# Patient Record
Sex: FEMALE | Race: WHITE | Employment: OTHER | URBAN - METROPOLITAN AREA
[De-identification: names, ages, dates, MRNs, and addresses within clinical notes are randomized per-mention and may not be internally consistent; named-entity substitution may affect disease eponyms.]

---

## 2019-05-23 ENCOUNTER — HOSPITAL ENCOUNTER (OUTPATIENT)
Dept: GENERAL RADIOLOGY | Age: 74
Discharge: HOME | End: 2019-05-25
Payer: COMMERCIAL

## 2019-05-23 DIAGNOSIS — Z12.31 VISIT FOR SCREENING MAMMOGRAM: ICD-10-CM

## 2019-05-23 PROCEDURE — 77063 BREAST TOMOSYNTHESIS BI: CPT

## 2019-11-03 ENCOUNTER — APPOINTMENT (OUTPATIENT)
Dept: CT IMAGING | Age: 74
End: 2019-11-03
Payer: COMMERCIAL

## 2019-11-03 ENCOUNTER — HOSPITAL ENCOUNTER (EMERGENCY)
Age: 74
Discharge: HOME | End: 2019-11-04
Attending: EMERGENCY MEDICINE
Payer: COMMERCIAL

## 2019-11-03 VITALS
BODY MASS INDEX: 28.8 KG/M2 | DIASTOLIC BLOOD PRESSURE: 98 MMHG | HEIGHT: 62 IN | WEIGHT: 156.5 LBS | HEART RATE: 83 BPM | OXYGEN SATURATION: 99 % | RESPIRATION RATE: 18 BRPM | TEMPERATURE: 98.3 F | SYSTOLIC BLOOD PRESSURE: 176 MMHG

## 2019-11-03 DIAGNOSIS — I31.39 PERICARDIAL EFFUSION: ICD-10-CM

## 2019-11-03 DIAGNOSIS — R06.02 SHORTNESS OF BREATH: Primary | ICD-10-CM

## 2019-11-03 DIAGNOSIS — R93.89 ABNORMAL CT SCAN: ICD-10-CM

## 2019-11-03 DIAGNOSIS — I10 HYPERTENSION, UNSPECIFIED TYPE: ICD-10-CM

## 2019-11-03 DIAGNOSIS — I51.7 CARDIAC ENLARGEMENT: ICD-10-CM

## 2019-11-03 LAB
ANION GAP SERPL CALCULATED.3IONS-SCNC: 11 MMOL/L (ref 7–16)
BASOPHILS ABSOLUTE: 0.01 E9/L (ref 0–0.2)
BASOPHILS RELATIVE PERCENT: 0.2 % (ref 0–2)
BUN BLDV-MCNC: 15 MG/DL (ref 8–23)
CALCIUM SERPL-MCNC: 9.5 MG/DL (ref 8.6–10.2)
CHLORIDE BLD-SCNC: 97 MMOL/L (ref 98–107)
CO2: 28 MMOL/L (ref 22–29)
CREAT SERPL-MCNC: 0.7 MG/DL (ref 0.5–1)
EOSINOPHILS ABSOLUTE: 0.08 E9/L (ref 0.05–0.5)
EOSINOPHILS RELATIVE PERCENT: 1.7 % (ref 0–6)
GFR AFRICAN AMERICAN: >60
GFR NON-AFRICAN AMERICAN: >60 ML/MIN/1.73
GLUCOSE BLD-MCNC: 102 MG/DL (ref 74–99)
HCT VFR BLD CALC: 34.2 % (ref 34–48)
HEMOGLOBIN: 11.3 G/DL (ref 11.5–15.5)
IMMATURE GRANULOCYTES #: 0.02 E9/L
IMMATURE GRANULOCYTES %: 0.4 % (ref 0–5)
LYMPHOCYTES ABSOLUTE: 1.71 E9/L (ref 1.5–4)
LYMPHOCYTES RELATIVE PERCENT: 36.3 % (ref 20–42)
MCH RBC QN AUTO: 31.2 PG (ref 26–35)
MCHC RBC AUTO-ENTMCNC: 33 % (ref 32–34.5)
MCV RBC AUTO: 94.5 FL (ref 80–99.9)
MONOCYTES ABSOLUTE: 0.49 E9/L (ref 0.1–0.95)
MONOCYTES RELATIVE PERCENT: 10.4 % (ref 2–12)
NEUTROPHILS ABSOLUTE: 2.4 E9/L (ref 1.8–7.3)
NEUTROPHILS RELATIVE PERCENT: 51 % (ref 43–80)
PDW BLD-RTO: 13 FL (ref 11.5–15)
PLATELET # BLD: 287 E9/L (ref 130–450)
PMV BLD AUTO: 9.1 FL (ref 7–12)
POTASSIUM SERPL-SCNC: 4.1 MMOL/L (ref 3.5–5)
RBC # BLD: 3.62 E12/L (ref 3.5–5.5)
SODIUM BLD-SCNC: 136 MMOL/L (ref 132–146)
TROPONIN: <0.01 NG/ML (ref 0–0.03)
WBC # BLD: 4.7 E9/L (ref 4.5–11.5)

## 2019-11-03 PROCEDURE — 80048 BASIC METABOLIC PNL TOTAL CA: CPT

## 2019-11-03 PROCEDURE — 94664 DEMO&/EVAL PT USE INHALER: CPT

## 2019-11-03 PROCEDURE — 85025 COMPLETE CBC W/AUTO DIFF WBC: CPT

## 2019-11-03 PROCEDURE — 84484 ASSAY OF TROPONIN QUANT: CPT

## 2019-11-03 PROCEDURE — 99285 EMERGENCY DEPT VISIT HI MDM: CPT

## 2019-11-03 PROCEDURE — 71275 CT ANGIOGRAPHY CHEST: CPT

## 2019-11-03 PROCEDURE — 6370000000 HC RX 637 (ALT 250 FOR IP): Performed by: EMERGENCY MEDICINE

## 2019-11-03 PROCEDURE — 36415 COLL VENOUS BLD VENIPUNCTURE: CPT

## 2019-11-03 PROCEDURE — 6360000004 HC RX CONTRAST MEDICATION: Performed by: RADIOLOGY

## 2019-11-03 PROCEDURE — 93005 ELECTROCARDIOGRAM TRACING: CPT | Performed by: EMERGENCY MEDICINE

## 2019-11-03 RX ORDER — IPRATROPIUM BROMIDE AND ALBUTEROL SULFATE 2.5; .5 MG/3ML; MG/3ML
1 SOLUTION RESPIRATORY (INHALATION) ONCE
Status: COMPLETED | OUTPATIENT
Start: 2019-11-03 | End: 2019-11-03

## 2019-11-03 RX ADMIN — IOPAMIDOL 80 ML: 755 INJECTION, SOLUTION INTRAVENOUS at 22:44

## 2019-11-03 RX ADMIN — IPRATROPIUM BROMIDE AND ALBUTEROL SULFATE 1 AMPULE: .5; 3 SOLUTION RESPIRATORY (INHALATION) at 20:47

## 2019-11-03 ASSESSMENT — PAIN SCALES - GENERAL: PAINLEVEL_OUTOF10: 5

## 2019-11-03 ASSESSMENT — PAIN DESCRIPTION - ORIENTATION: ORIENTATION: MID

## 2019-11-03 ASSESSMENT — PAIN DESCRIPTION - LOCATION: LOCATION: CHEST

## 2019-11-03 ASSESSMENT — PAIN DESCRIPTION - FREQUENCY: FREQUENCY: CONTINUOUS

## 2019-11-03 ASSESSMENT — ENCOUNTER SYMPTOMS
ABDOMINAL PAIN: 0
SHORTNESS OF BREATH: 1
CHEST TIGHTNESS: 1

## 2019-11-03 ASSESSMENT — PAIN DESCRIPTION - PAIN TYPE: TYPE: ACUTE PAIN

## 2019-11-03 ASSESSMENT — PAIN DESCRIPTION - ONSET: ONSET: ON-GOING

## 2019-11-03 ASSESSMENT — PAIN DESCRIPTION - PROGRESSION: CLINICAL_PROGRESSION: NOT CHANGED

## 2019-11-03 ASSESSMENT — PAIN DESCRIPTION - DESCRIPTORS: DESCRIPTORS: TIGHTNESS;HEAVINESS

## 2019-11-04 LAB
EKG ATRIAL RATE: 84 BPM
EKG P AXIS: -4 DEGREES
EKG P-R INTERVAL: 124 MS
EKG Q-T INTERVAL: 346 MS
EKG QRS DURATION: 92 MS
EKG QTC CALCULATION (BAZETT): 408 MS
EKG R AXIS: 29 DEGREES
EKG T AXIS: 28 DEGREES
EKG VENTRICULAR RATE: 84 BPM

## 2021-05-27 ENCOUNTER — HOSPITAL ENCOUNTER (OUTPATIENT)
Dept: GENERAL RADIOLOGY | Age: 76
Discharge: HOME | End: 2021-05-29
Payer: MEDICARE

## 2021-05-27 DIAGNOSIS — Z12.31 VISIT FOR SCREENING MAMMOGRAM: ICD-10-CM

## 2021-05-27 PROCEDURE — 77063 BREAST TOMOSYNTHESIS BI: CPT

## 2021-06-03 ENCOUNTER — TELEPHONE (OUTPATIENT)
Dept: GENERAL RADIOLOGY | Age: 76
End: 2021-06-03

## 2021-06-08 ENCOUNTER — HOSPITAL ENCOUNTER (OUTPATIENT)
Dept: GENERAL RADIOLOGY | Age: 76
Discharge: HOME | End: 2021-06-10
Payer: MEDICARE

## 2021-06-08 DIAGNOSIS — R92.8 ABNORMAL MAMMOGRAM OF LEFT BREAST: ICD-10-CM

## 2021-06-08 DIAGNOSIS — R92.8 ABNORMAL MAMMOGRAM OF RIGHT BREAST: ICD-10-CM

## 2021-06-08 PROCEDURE — 77065 DX MAMMO INCL CAD UNI: CPT

## 2021-06-08 PROCEDURE — 76642 ULTRASOUND BREAST LIMITED: CPT

## 2021-12-08 ENCOUNTER — HOSPITAL ENCOUNTER (OUTPATIENT)
Dept: GENERAL RADIOLOGY | Age: 76
Discharge: HOME | End: 2021-12-10
Payer: MEDICARE

## 2021-12-08 DIAGNOSIS — Z09 FOLLOW-UP EXAM, 3-6 MONTHS SINCE PREVIOUS EXAM: ICD-10-CM

## 2021-12-08 PROCEDURE — 76642 ULTRASOUND BREAST LIMITED: CPT

## 2021-12-27 ENCOUNTER — HOSPITAL ENCOUNTER (OUTPATIENT)
Dept: GENERAL RADIOLOGY | Age: 76
Discharge: HOME | End: 2021-12-29
Payer: MEDICARE

## 2021-12-27 DIAGNOSIS — R92.8 ABNORMAL MAMMOGRAM: ICD-10-CM

## 2021-12-27 PROCEDURE — 88305 TISSUE EXAM BY PATHOLOGIST: CPT

## 2021-12-27 PROCEDURE — A4648 IMPLANTABLE TISSUE MARKER: HCPCS

## 2021-12-27 PROCEDURE — 2500000003 HC RX 250 WO HCPCS

## 2021-12-27 PROCEDURE — 77065 DX MAMMO INCL CAD UNI: CPT

## 2021-12-27 NOTE — PROGRESS NOTES
Met with patient prior to her breast biopsy. Instructed on role of breast navigator and on breast biopsy procedure. Instructed that results will be available in approximately 3-5 days. Provided with folder containing breast navigator's contact information, monthly breast self exam card, and post biopsy discharge instructions. Instructed to call if she has any questions or concerns about her biopsy. Verbalizes understanding.

## 2022-01-05 ENCOUNTER — TELEPHONE (OUTPATIENT)
Dept: GENERAL RADIOLOGY | Age: 77
End: 2022-01-05

## 2022-01-05 NOTE — TELEPHONE ENCOUNTER
Sedrick Balloon called to discuss her recent right breast biopsy results. She states she saw results on her [facility] account. I instructed her that the pathology report from her breast biopsy indicates a benign finding: fibroadenoma. Instructed on definition of fibroadenoma. Instructed that the performing radiologist will review her breast images and the pathology results for concordance. Instructed that she should have annual mammogram and follow her physician's recommendations for any follow up care. Verbalizes understanding. Pathology report faxed to Dr. Ana Jackman office.  Electronically signed by Luis Sevilla RN, BSN on 1/5/2022 at 2:15 PM

## 2023-06-09 ENCOUNTER — HOSPITAL ENCOUNTER (OUTPATIENT)
Dept: GENERAL RADIOLOGY | Age: 78
End: 2023-06-09
Payer: MEDICARE

## 2023-06-09 DIAGNOSIS — Z12.31 VISIT FOR SCREENING MAMMOGRAM: ICD-10-CM

## 2023-06-09 PROCEDURE — 77063 BREAST TOMOSYNTHESIS BI: CPT

## 2024-06-17 ENCOUNTER — HOSPITAL ENCOUNTER (OUTPATIENT)
Dept: GENERAL RADIOLOGY | Age: 79
Discharge: HOME | End: 2024-06-19
Payer: MEDICARE

## 2024-06-17 VITALS — BODY MASS INDEX: 26.68 KG/M2 | WEIGHT: 145 LBS | HEIGHT: 62 IN

## 2024-06-17 DIAGNOSIS — Z12.31 VISIT FOR SCREENING MAMMOGRAM: ICD-10-CM

## 2024-06-17 PROCEDURE — 77063 BREAST TOMOSYNTHESIS BI: CPT

## 2025-08-08 ENCOUNTER — HOSPITAL ENCOUNTER (OUTPATIENT)
Dept: GENERAL RADIOLOGY | Age: 80
Discharge: HOME | End: 2025-08-10
Payer: MEDICARE

## 2025-08-08 DIAGNOSIS — M85.89 OSTEOPENIA OF MULTIPLE SITES: ICD-10-CM

## 2025-08-08 DIAGNOSIS — Z12.31 VISIT FOR SCREENING MAMMOGRAM: ICD-10-CM

## 2025-08-08 PROCEDURE — 77063 BREAST TOMOSYNTHESIS BI: CPT

## 2025-08-08 PROCEDURE — 77080 DXA BONE DENSITY AXIAL: CPT
